# Patient Record
Sex: FEMALE | Race: WHITE | Employment: OTHER | URBAN - METROPOLITAN AREA
[De-identification: names, ages, dates, MRNs, and addresses within clinical notes are randomized per-mention and may not be internally consistent; named-entity substitution may affect disease eponyms.]

---

## 2017-08-28 DIAGNOSIS — R05.9 COUGH: Primary | ICD-10-CM

## 2017-08-28 NOTE — PROGRESS NOTES
Verbal Order with read back per Renee Wagner MD  For PFT smart panel. AMB POC PFT complete w/ bronchodilator  AMB POC PFT complete w/o bronchodilator    Dr. Yash Todd MD will co-sign the orders.

## 2017-09-08 PROBLEM — R05.3 CHRONIC COUGH: Status: ACTIVE | Noted: 2017-07-12

## 2017-09-11 ENCOUNTER — OFFICE VISIT (OUTPATIENT)
Dept: PULMONOLOGY | Age: 72
End: 2017-09-11

## 2017-09-11 VITALS
HEIGHT: 67 IN | OXYGEN SATURATION: 97 % | WEIGHT: 181 LBS | BODY MASS INDEX: 28.41 KG/M2 | SYSTOLIC BLOOD PRESSURE: 130 MMHG | TEMPERATURE: 97.7 F | HEART RATE: 73 BPM | DIASTOLIC BLOOD PRESSURE: 80 MMHG | RESPIRATION RATE: 20 BRPM

## 2017-09-11 DIAGNOSIS — R05.9 COUGH: Primary | ICD-10-CM

## 2017-09-11 RX ORDER — GUAIFENESIN 600 MG/1
600 TABLET, EXTENDED RELEASE ORAL 2 TIMES DAILY
COMMUNITY

## 2017-09-11 RX ORDER — TROLAMINE SALICYLATE 10 G/100G
CREAM TOPICAL AS NEEDED
COMMUNITY

## 2017-09-11 RX ORDER — EZETIMIBE 10 MG/1
TABLET ORAL
COMMUNITY

## 2017-09-11 RX ORDER — OMEPRAZOLE 20 MG/1
20 CAPSULE, DELAYED RELEASE ORAL DAILY
COMMUNITY

## 2017-09-11 RX ORDER — CLOPIDOGREL BISULFATE 75 MG/1
TABLET ORAL
COMMUNITY

## 2017-09-11 RX ORDER — THERA TABS 400 MCG
1 TAB ORAL DAILY
COMMUNITY

## 2017-09-11 RX ORDER — FLUOCINONIDE 0.5 MG/G
CREAM TOPICAL 2 TIMES DAILY
COMMUNITY

## 2017-09-11 RX ORDER — TEMAZEPAM 15 MG/1
CAPSULE ORAL
COMMUNITY

## 2017-09-11 RX ORDER — SERTRALINE HYDROCHLORIDE 100 MG/1
TABLET, FILM COATED ORAL DAILY
COMMUNITY

## 2017-09-11 RX ORDER — CHOLECALCIFEROL (VITAMIN D3) 125 MCG
CAPSULE ORAL
COMMUNITY

## 2017-09-11 RX ORDER — LIDOCAINE 40 MG/G
CREAM TOPICAL
COMMUNITY

## 2017-09-11 RX ORDER — CETIRIZINE HCL 10 MG
TABLET ORAL
COMMUNITY

## 2017-09-11 RX ORDER — ASPIRIN 81 MG/1
TABLET ORAL DAILY
COMMUNITY

## 2017-09-11 NOTE — MR AVS SNAPSHOT
Visit Information Date & Time Provider Department Dept. Phone Encounter #  
 9/11/2017 11:00 AM Lars Claudio MD [facility] [id number] Upcoming Health Maintenance Date Due DTaP/Tdap/Td series (1 - Tdap) 8/13/1965 BREAST CANCER SCRN MAMMOGRAM 8/13/1994 FOBT Q 1 YEAR AGE 50-75 8/13/1994 ZOSTER VACCINE AGE 60> 6/13/2004 GLAUCOMA SCREENING Q2Y 8/13/2009 OSTEOPOROSIS SCREENING (DEXA) 8/13/2009 Pneumococcal 65+ Low/Medium Risk (1 of 2 - PCV13) 8/13/2009 MEDICARE YEARLY EXAM 8/13/2009 INFLUENZA AGE 9 TO ADULT 8/1/2017 Allergies as of 9/11/2017  Review Complete On: 9/11/2017 By: Mariela Ellis, RT Severity Noted Reaction Type Reactions Bextra [Valdecoxib] High 09/11/2017   Side Effect Palpitations Severe chest pain Codeine Medium 09/11/2017   Side Effect Nausea Only Iodine And Iodide Containing Products Medium 09/11/2017   Side Effect Hives Current Immunizations  Never Reviewed No immunizations on file. Not reviewed this visit You Were Diagnosed With   
  
 Codes Comments Cough    -  Primary ICD-10-CM: S14 ICD-9-CM: 284. 2 Vitals BP Pulse Temp Resp Height(growth percentile) Weight(growth percentile) 130/80 (BP 1 Location: Left arm, BP Patient Position: At rest) 73 97.7 °F (36.5 °C) (Oral) 20 5' 7\" (1.702 m) 181 lb (82.1 kg) SpO2 BMI Smoking Status 97% 28.35 kg/m2 Former Smoker BMI and BSA Data Body Mass Index Body Surface Area  
 28.35 kg/m 2 1.97 m 2 Your Updated Medication List  
  
   
This list is accurate as of: 9/11/17 11:26 AM.  Always use your most recent med list.  
  
  
  
  
 aspirin delayed-release 81 mg tablet Take  by mouth daily. CALCIUM CARB-MAG HYDROX-SIMETH PO Take  by mouth. clopidogrel 75 mg Tab Commonly known as:  PLAVIX Take  by mouth.  
  
 ezetimibe 10 mg tablet Commonly known as:  Mallory Stephenson Take  by mouth. fluocinoNIDE 0.05 % topical cream  
Commonly known as:  LIDEX Apply  to affected area two (2) times a day. lidocaine-transparent dressing 4 % dressing Commonly known as:  LMX 4 PLUS Apply  to affected area. MUCINEX 600 mg ER tablet Generic drug:  guaiFENesin ER Take 600 mg by mouth two (2) times a day. omeprazole 20 mg capsule Commonly known as:  PRILOSEC Take 20 mg by mouth daily. sertraline 100 mg tablet Commonly known as:  ZOLOFT Take  by mouth daily. temazepam 15 mg capsule Commonly known as:  RESTORIL Take  by mouth nightly as needed for Sleep.  
  
 therapeutic multivitamin tablet Commonly known as:  [facility] Take 1 Tab by mouth daily. trolamine salicylate 10 % topical cream  
Commonly known as:  MYOFLEX Apply  to affected area as needed. VITAMIN D3 2,000 unit Tab Generic drug:  cholecalciferol (vitamin D3) Take  by mouth. ZyrTEC 10 mg tablet Generic drug:  cetirizine Take  by mouth. We Performed the Following AMB POC PFT COMPLETE W/BRONCHODILATOR [10431 CPT(R)] To-Do List   
 09/11/2017 Imaging:  XR CHEST PA LAT Introducing [facility]! [facility] introduces [employer] patient portal. Now you can access parts of your medical record, email your doctor's office, and request medication refills online. 1. In your internet browser, go to [email or web address] 2. Click on the First Time User? Click Here link in the Sign In box. You will see the New Member Sign Up page. 3. Enter your [employer] Access Code exactly as it appears below. You will not need to use this code after youve completed the sign-up process. If you do not sign up before the expiration date, you must request a new code. · [employer] Access Code: [email or web address] Expires: 12/10/2017  9:51 AM 
 
4.  Enter the last four digits of your Social Security Number (xxxx) and Date of Birth (mm/dd/yyyy) as indicated and click Submit. You will be taken to the next sign-up page. 5. Create a [employer] ID. This will be your [employer] login ID and cannot be changed, so think of one that is secure and easy to remember. 6. Create a [employer] password. You can change your password at any time. 7. Enter your Password Reset Question and Answer. This can be used at a later time if you forget your password. 8. Enter your e-mail address. You will receive e-mail notification when new information is available in [address] 9. Click Sign Up. You can now view and download portions of your medical record. 10. Click the Download Summary menu link to download a portable copy of your medical information. If you have questions, please visit the Frequently Asked Questions section of the [employer] website. Remember, [employer] is NOT to be used for urgent needs. For medical emergencies, dial 911. Now available from your iPhone and Android! Please provide this summary of care documentation to your next provider. Your primary care clinician is listed as Hayde Quispe. If you have any questions after today's visit, please call [phone].

## 2017-09-11 NOTE — PATIENT INSTRUCTIONS
Consider sleeping with the head of your bed elevated by using 4 inch blocks with the top cut out so the head post fit in the blocks keeping the bed from slipping off the blocks  Also you can use \"bed risers \"from [employer] or the Internet  Try not eating for about 2 hours before bedtime and taking your present Prilosec daily

## 2017-09-11 NOTE — PROGRESS NOTES
[facility]  Pulmonary, Critical Care, and Sleep Medicine    Dear Dr. Tico Maher,    Chief complaint:  Cough    HPI:  Renuka Lopez is 67years old and comes to the office today for a chronic cough which the patient relates has been bothering her for approximately 1-1/2 years. She relates the cough is occasionally productive but mostly dry and is productive of clear and sometimes slightly yellow mucus. She says she has allergy symptoms such as nasal stuffiness and rhinorrhea but her cough bothers her all year round and interestingly off and on and she is says that her cough seems to bother her when she has an episode of aspiration of liquids or solids and then it gradually improves and then reoccurs again which he has another episode which is every week or 2. Interestingly she also has symptoms of gastroesophageal reflux disease. She denies chest pain shortness of breath leg pain or swelling  Allergies   Allergen Reactions    Bextra [Valdecoxib] Palpitations     Severe chest pain    Codeine Nausea Only    Iodine And Iodide Containing Products Hives     Current Outpatient Prescriptions   Medication Sig    aspirin delayed-release 81 mg tablet Take  by mouth daily.  CALCIUM CARB-MAG HYDROX-SIMETH PO Take  by mouth.  clopidogrel (PLAVIX) 75 mg tab Take  by mouth.  ezetimibe (ZETIA) 10 mg tablet Take  by mouth.  fluocinoNIDE (LIDEX) 0.05 % topical cream Apply  to affected area two (2) times a day.  lidocaine-transparent dressing (LMX 4 PLUS) 4 % dressing Apply  to affected area.  guaiFENesin ER (MUCINEX) 600 mg ER tablet Take 600 mg by mouth two (2) times a day.  omeprazole (PRILOSEC) 20 mg capsule Take 20 mg by mouth daily.  sertraline (ZOLOFT) 100 mg tablet Take  by mouth daily.  temazepam (RESTORIL) 15 mg capsule Take  by mouth nightly as needed for Sleep.  therapeutic multivitamin (THERAGRAN) tablet Take 1 Tab by mouth daily.     trolamine salicylate (MYOFLEX) 10 % topical cream Apply  to affected area as needed.  cholecalciferol, vitamin D3, (VITAMIN D3) 2,000 unit tab Take  by mouth.  cetirizine (ZYRTEC) 10 mg tablet Take  by mouth. No current facility-administered medications for this visit. Past Medical History:   Diagnosis Date    Allergic rhinitis     Arthritis     [facility]immune disease [facility] [address]     Chronic bronchitis ([facility] [address]     Chronic pain 09/2015    Right wrist fracture    Coronary artery disease     GERD (gastroesophageal reflux disease)     Positive PPD     Sleep apnea     has not been tested   She denies history of diabetes hypertension kidney disease liver disease ulcers cancer asthma or emphysema but has a history of polymyalgia rheumatica psoriasis and latent tuberculosis which was never treated  Past Surgical History:   Procedure Laterality Date    HX CORONARY STENT PLACEMENT  03/2011    x 3    HX HEART CATHETERIZATION  03/2011    HX WRIST FRACTURE TX Right 09/29/2015       Family history: Liver cancer prostate cancer       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   She denies syncope focal muscle weakness or numbness trouble seeing and has significant dysphasia as already reported and also reports mild hearing loss    Physical Exam:  Visit Vitals    /80 (BP 1 Location: Left arm, BP Patient Position: At rest)    Pulse 73    Temp 97.7 °F (36.5 °C) (Oral)    Resp 20    Ht 5' 7\" (1.702 m)    Wt 82.1 kg (181 lb)    SpO2 97%    BMI 28.35 kg/m2     Well-developed well-nourished  HEENT: pupils equal, reactive, sclera, non-icteric   Oropharynx tongue: normal   Neck: Supple   Lymph Nodes: Supra clavicular and cervical nodes, negative   Chest: Equal symmetrical expansion, no dullness, no wheezes, rales or rubs   Heart: Regular, rhythm without nick or murmur no carotid bruits  Abdomen: soft, non-tender no masses or organomegaly   Extremities: no cyanosis, clubbing, no edema no calf tenderness  Musculoskeletal: No acute joint inflammation or muscle wasting  Skin: No rash   Neurological: alert, oriented, moves all extremities      LABS:  Spirometry 9/11/17: Very mild obstructive lung defect no improvement with bronchodilator  Chest x-ray 9/11/17: Normal appearance    Impression:   Cough with no evidence of reactive airway disease or abnormal chest x-ray with several possibilities including allergy symptoms but the allergy symptoms are only present during certain parts of the year and her cough is year-long making this less likely. She also has gastroesophageal reflux which could be causing the cough but the most likely consideration would be aspiration from her history    Plan:  Document aspiration and if positive swallow evaluation with speech therapist  Empiric treatment for gastroesophageal reflux  Follow-up in 4 weeks    Thanks for allowing me to share in this patient's evaluation    Sincerely,    Lexx Pereira MD , [facility]    CC: Pedrito Cochran MD    [address]  [place], [address] [facility] [address]     P: [phone]     F: [phone]